# Patient Record
Sex: FEMALE | Race: WHITE | NOT HISPANIC OR LATINO | URBAN - METROPOLITAN AREA
[De-identification: names, ages, dates, MRNs, and addresses within clinical notes are randomized per-mention and may not be internally consistent; named-entity substitution may affect disease eponyms.]

---

## 2024-01-26 ENCOUNTER — APPOINTMENT (OUTPATIENT)
Dept: PRIMARY CARE | Facility: CLINIC | Age: 24
End: 2024-01-26

## 2024-01-28 ENCOUNTER — HOSPITAL ENCOUNTER (OUTPATIENT)
Dept: RADIOLOGY | Facility: EXTERNAL LOCATION | Age: 24
Discharge: HOME | End: 2024-01-28

## 2024-01-28 DIAGNOSIS — M25.571 ACUTE RIGHT ANKLE PAIN: ICD-10-CM

## 2024-10-31 ENCOUNTER — APPOINTMENT (OUTPATIENT)
Dept: OBSTETRICS AND GYNECOLOGY | Facility: CLINIC | Age: 24
End: 2024-10-31
Payer: COMMERCIAL

## 2024-11-07 ENCOUNTER — APPOINTMENT (OUTPATIENT)
Dept: OBSTETRICS AND GYNECOLOGY | Facility: CLINIC | Age: 24
End: 2024-11-07
Payer: COMMERCIAL

## 2024-12-19 ENCOUNTER — APPOINTMENT (OUTPATIENT)
Dept: OBSTETRICS AND GYNECOLOGY | Facility: CLINIC | Age: 24
End: 2024-12-19
Payer: COMMERCIAL

## 2024-12-19 ENCOUNTER — LAB (OUTPATIENT)
Dept: LAB | Facility: LAB | Age: 24
End: 2024-12-19
Payer: COMMERCIAL

## 2024-12-19 VITALS
WEIGHT: 205 LBS | HEIGHT: 70 IN | BODY MASS INDEX: 29.35 KG/M2 | SYSTOLIC BLOOD PRESSURE: 118 MMHG | DIASTOLIC BLOOD PRESSURE: 74 MMHG

## 2024-12-19 DIAGNOSIS — Z01.419 WOMEN'S ANNUAL ROUTINE GYNECOLOGICAL EXAMINATION: ICD-10-CM

## 2024-12-19 DIAGNOSIS — Z01.419 WOMEN'S ANNUAL ROUTINE GYNECOLOGICAL EXAMINATION: Primary | ICD-10-CM

## 2024-12-19 LAB — HIV 1+2 AB+HIV1 P24 AG SERPL QL IA: NONREACTIVE

## 2024-12-19 PROCEDURE — 87591 N.GONORRHOEAE DNA AMP PROB: CPT

## 2024-12-19 PROCEDURE — 3008F BODY MASS INDEX DOCD: CPT | Performed by: ADVANCED PRACTICE MIDWIFE

## 2024-12-19 PROCEDURE — 87389 HIV-1 AG W/HIV-1&-2 AB AG IA: CPT

## 2024-12-19 PROCEDURE — 36415 COLL VENOUS BLD VENIPUNCTURE: CPT

## 2024-12-19 PROCEDURE — 86803 HEPATITIS C AB TEST: CPT

## 2024-12-19 PROCEDURE — 86780 TREPONEMA PALLIDUM: CPT

## 2024-12-19 PROCEDURE — 87205 SMEAR GRAM STAIN: CPT

## 2024-12-19 PROCEDURE — 1036F TOBACCO NON-USER: CPT | Performed by: ADVANCED PRACTICE MIDWIFE

## 2024-12-19 PROCEDURE — 87340 HEPATITIS B SURFACE AG IA: CPT

## 2024-12-19 PROCEDURE — 99385 PREV VISIT NEW AGE 18-39: CPT | Performed by: ADVANCED PRACTICE MIDWIFE

## 2024-12-19 PROCEDURE — 87491 CHLMYD TRACH DNA AMP PROBE: CPT

## 2024-12-19 RX ORDER — HYDROXYZINE HYDROCHLORIDE 25 MG/1
25 TABLET, FILM COATED ORAL AS NEEDED
COMMUNITY
Start: 2024-09-30

## 2024-12-19 RX ORDER — SERTRALINE HYDROCHLORIDE 100 MG/1
100 TABLET, FILM COATED ORAL EVERY 24 HOURS
COMMUNITY
Start: 2021-03-30

## 2024-12-19 ASSESSMENT — PAIN SCALES - GENERAL: PAINLEVEL_OUTOF10: 0-NO PAIN

## 2024-12-19 NOTE — PROGRESS NOTES
"Assessment/Plan   Diagnoses and all orders for this visit:  Women's annual routine gynecological examination      Liza NOEL Jan, APRN-CN     Subjective   Darrell Goldman is a 24 y.o. female who is here for a routine exam.     Concerns today:  Denies specific concerns today    Patient's last menstrual period was 2024 (exact date).   Periods are regular every 28-30 days, lasting 5 days.   Dysmenorrhea:mild, occurring premenstrually and first 1-2 days of flow.   Cyclic symptoms include pelvic pain.     Sexual Activity: sexually active, male partners; Patient reports 1 partners in the last 12 months.  Pain with intercourse? No   Loss of desire? No       History of prior STI: none    Current contraception: IUD    Last pap:   History of abnormal Pap smear: no  Family history of uterine or ovarian cancer: yes - uterine cancer/mother    Last mammogram: n/a  History of abnormal mammogram: no  Family history of breast cancer: no  Menstrual History:  OB History          0    Para   0    Term   0       0    AB   0    Living   0         SAB   0    IAB   0    Ectopic   0    Multiple   0    Live Births   0                Patient's last menstrual period was 2024 (exact date).       Objective   /74 (BP Location: Left arm, Patient Position: Sitting)   Ht 1.778 m (5' 10\")   Wt 93 kg (205 lb)   LMP 2024 (Exact Date)   BMI 29.41 kg/m²   Physical Exam  Constitutional:       Appearance: Normal appearance.   Genitourinary:      Bladder and rectum normal.      No lesions in the vagina.      Right Labia: No rash, tenderness, lesions, skin changes or Bartholin's cyst.     Left Labia: No tenderness, lesions, skin changes, Bartholin's cyst or rash.     No vaginal discharge, erythema, tenderness or bleeding.      No vaginal prolapse present.     No vaginal atrophy present.       Right Adnexa: not tender, not full and no mass present.     Left Adnexa: not tender, not full and no mass present.   "   No cervical motion tenderness, discharge, friability, lesion, polyp or nabothian cyst.      IUD strings visualized.      Uterus is not enlarged, fixed, tender, irregular or prolapsed.      No uterine mass detected.     Pelvic floor neuro is intact.  Breasts:     Breasts are soft.     Right: Normal.      Left: Normal.   Cardiovascular:      Rate and Rhythm: Normal rate and regular rhythm.   Pulmonary:      Effort: Pulmonary effort is normal.      Breath sounds: Normal breath sounds.   Abdominal:      General: Abdomen is flat. Bowel sounds are normal.      Palpations: Abdomen is soft.   Musculoskeletal:         General: Normal range of motion.      Cervical back: Normal range of motion.   Neurological:      General: No focal deficit present.      Mental Status: She is alert and oriented to person, place, and time. Mental status is at baseline.   Skin:     General: Skin is warm and dry.   Psychiatric:         Mood and Affect: Mood normal.         Behavior: Behavior normal.         Thought Content: Thought content normal.         Judgment: Judgment normal.     Annual exam in 1 year     Liza Montoya, APRN-CNM

## 2024-12-20 LAB
BACTERIAL VAGINOSIS VAG-IMP: NORMAL
C TRACH RRNA SPEC QL NAA+PROBE: NEGATIVE
CLUE CELLS VAG LPF-#/AREA: NORMAL /[LPF]
HBV SURFACE AG SERPL QL IA: NONREACTIVE
HCV AB SER QL: NONREACTIVE
N GONORRHOEA DNA SPEC QL PROBE+SIG AMP: NEGATIVE
NUGENT SCORE: 6
TREPONEMA PALLIDUM IGG+IGM AB [PRESENCE] IN SERUM OR PLASMA BY IMMUNOASSAY: NONREACTIVE
YEAST VAG WET PREP-#/AREA: NORMAL

## 2025-02-20 ENCOUNTER — OFFICE VISIT (OUTPATIENT)
Dept: PRIMARY CARE | Facility: CLINIC | Age: 25
End: 2025-02-20
Payer: COMMERCIAL

## 2025-02-20 VITALS
SYSTOLIC BLOOD PRESSURE: 112 MMHG | DIASTOLIC BLOOD PRESSURE: 80 MMHG | HEIGHT: 70 IN | BODY MASS INDEX: 30.06 KG/M2 | WEIGHT: 210 LBS | OXYGEN SATURATION: 98 % | HEART RATE: 77 BPM

## 2025-02-20 DIAGNOSIS — Z00.00 ANNUAL PHYSICAL EXAM: ICD-10-CM

## 2025-02-20 DIAGNOSIS — Z76.89 ENCOUNTER TO ESTABLISH CARE: Primary | ICD-10-CM

## 2025-02-20 PROBLEM — F41.1 GENERALIZED ANXIETY DISORDER: Status: ACTIVE | Noted: 2025-02-20

## 2025-02-20 PROBLEM — K58.9 IBS (IRRITABLE BOWEL SYNDROME): Status: ACTIVE | Noted: 2025-02-20

## 2025-02-20 PROBLEM — F41.0 PANIC ATTACKS: Status: ACTIVE | Noted: 2025-02-20

## 2025-02-20 PROBLEM — Z90.89 S/P TONSILLECTOMY AND ADENOIDECTOMY: Status: ACTIVE | Noted: 2025-02-20

## 2025-02-20 PROCEDURE — 99385 PREV VISIT NEW AGE 18-39: CPT

## 2025-02-20 PROCEDURE — 3008F BODY MASS INDEX DOCD: CPT

## 2025-02-20 PROCEDURE — 1036F TOBACCO NON-USER: CPT

## 2025-02-20 ASSESSMENT — ENCOUNTER SYMPTOMS
DYSURIA: 0
LIGHT-HEADEDNESS: 0
MYALGIAS: 0
SORE THROAT: 0
BLOOD IN STOOL: 0
ADENOPATHY: 0
COUGH: 0
NAUSEA: 0
FATIGUE: 0
FEVER: 0
HEMATURIA: 0
DIFFICULTY URINATING: 0
ARTHRALGIAS: 0
DIAPHORESIS: 0
WHEEZING: 0
SHORTNESS OF BREATH: 0
PALPITATIONS: 0
DIARRHEA: 0
VOMITING: 0

## 2025-02-20 ASSESSMENT — PAIN SCALES - GENERAL: PAINLEVEL_OUTOF10: 0-NO PAIN

## 2025-02-20 ASSESSMENT — PATIENT HEALTH QUESTIONNAIRE - PHQ9
1. LITTLE INTEREST OR PLEASURE IN DOING THINGS: NOT AT ALL
2. FEELING DOWN, DEPRESSED OR HOPELESS: NOT AT ALL
SUM OF ALL RESPONSES TO PHQ9 QUESTIONS 1 AND 2: 0

## 2025-02-20 NOTE — PROGRESS NOTES
"Subjective   Patient ID: Darrell Goldman is a 24 y.o. female who presents for Establish Care.    Hx of EDWARD, IBS    New patient, has not had PCP    Other providers: [facility], [facility] for psychiatrist and therapist (rx sertraline and hydroxyzine)    Fhx: +stroke, mental health, alcohol use disorder    HM: PAP 1/2023 UTD through ob/gyn. Mammogram start 41. Colon screening start 46. Lung screening never smoker. DEXA start 66. Vaccinations Tdap (through work), HPV UTD, flu UTD.           Review of Systems   Constitutional:  Negative for diaphoresis, fatigue and fever.   HENT:  Negative for congestion, hearing loss and sore throat.    Eyes:  Negative for visual disturbance.   Respiratory:  Negative for cough, shortness of breath and wheezing.    Cardiovascular:  Negative for chest pain, palpitations and leg swelling.   Gastrointestinal:  Negative for blood in stool, diarrhea, nausea and vomiting.   Genitourinary:  Negative for difficulty urinating, dysuria and hematuria.   Musculoskeletal:  Negative for arthralgias and myalgias.   Skin:  Negative for rash.   Allergic/Immunologic: Negative for immunocompromised state.   Neurological:  Negative for syncope and light-headedness.   Hematological:  Negative for adenopathy.   Psychiatric/Behavioral:  Negative for suicidal ideas.        Objective   /80   Pulse 77   Ht 1.778 m (5' 10\")   Wt 95.3 kg (210 lb)   SpO2 98%   BMI 30.13 kg/m²     Physical Exam  Constitutional:       General: She is not in acute distress.     Appearance: Normal appearance.   HENT:      Right Ear: Tympanic membrane and ear canal normal.      Left Ear: Tympanic membrane and ear canal normal.      Nose: Nose normal.      Mouth/Throat:      Mouth: Mucous membranes are moist.      Pharynx: Oropharynx is clear. No oropharyngeal exudate or posterior oropharyngeal erythema.   Eyes:      Extraocular Movements: Extraocular movements intact.      Conjunctiva/sclera: " Conjunctivae normal.      Pupils: Pupils are equal, round, and reactive to light.   Neck:      Thyroid: No thyroid mass or thyromegaly.   Cardiovascular:      Rate and Rhythm: Normal rate and regular rhythm.      Pulses: Normal pulses.      Heart sounds: No murmur heard.     No gallop.   Pulmonary:      Effort: Pulmonary effort is normal.      Breath sounds: No wheezing, rhonchi or rales.   Abdominal:      General: Bowel sounds are normal.      Palpations: Abdomen is soft. There is no hepatomegaly, splenomegaly or mass.      Tenderness: There is no abdominal tenderness. There is no guarding.   Musculoskeletal:         General: Normal range of motion.      Right lower leg: No edema.      Left lower leg: No edema.   Lymphadenopathy:      Cervical: No cervical adenopathy.   Skin:     General: Skin is warm.      Findings: No rash.   Neurological:      General: No focal deficit present.      Mental Status: She is alert.      Motor: Motor function is intact. No weakness.   Psychiatric:         Mood and Affect: Mood normal.         Thought Content: Thought content normal.         Assessment/Plan   Diagnoses and all orders for this visit:  Encounter to establish care  Annual physical exam  Follow-up one year for CPE.

## 2025-02-27 ENCOUNTER — APPOINTMENT (OUTPATIENT)
Dept: PRIMARY CARE | Facility: CLINIC | Age: 25
End: 2025-02-27
Payer: COMMERCIAL

## 2025-06-07 ENCOUNTER — OFFICE VISIT (OUTPATIENT)
Dept: URGENT CARE | Age: 25
End: 2025-06-07
Payer: COMMERCIAL

## 2025-06-07 VITALS
DIASTOLIC BLOOD PRESSURE: 65 MMHG | SYSTOLIC BLOOD PRESSURE: 106 MMHG | HEART RATE: 90 BPM | RESPIRATION RATE: 18 BRPM | TEMPERATURE: 98.1 F | OXYGEN SATURATION: 100 % | BODY MASS INDEX: 27.2 KG/M2 | HEIGHT: 70 IN | WEIGHT: 190 LBS

## 2025-06-07 DIAGNOSIS — S39.012A STRAIN OF LUMBAR REGION, INITIAL ENCOUNTER: Primary | ICD-10-CM

## 2025-06-07 PROCEDURE — 1036F TOBACCO NON-USER: CPT | Performed by: NURSE PRACTITIONER

## 2025-06-07 PROCEDURE — 3008F BODY MASS INDEX DOCD: CPT | Performed by: NURSE PRACTITIONER

## 2025-06-07 PROCEDURE — 99213 OFFICE O/P EST LOW 20 MIN: CPT | Performed by: NURSE PRACTITIONER

## 2025-06-07 RX ORDER — CYCLOBENZAPRINE HCL 10 MG
5 TABLET ORAL 3 TIMES DAILY
Qty: 20 TABLET | Refills: 0 | Status: SHIPPED | OUTPATIENT
Start: 2025-06-07 | End: 2025-06-17

## 2025-06-07 RX ORDER — METHYLPREDNISOLONE 4 MG/1
TABLET ORAL
Qty: 21 TABLET | Refills: 0 | Status: SHIPPED | OUTPATIENT
Start: 2025-06-07 | End: 2025-06-13

## 2025-06-07 ASSESSMENT — ENCOUNTER SYMPTOMS: BACK PAIN: 1

## 2025-06-07 NOTE — PROGRESS NOTES
"Subjective   Patient ID: Darrell Goldman is a 24 y.o. female. They present today with a chief complaint of Back Pain (Low back Pain x 1 week//[facility]).    History of Present Illness  Here for lower back pain - has started after playing volleyball  Denies saddle numbness or change in bowel or bladder      Back Pain        Past Medical History  Allergies as of 06/07/2025 - Reviewed 06/07/2025   Allergen Reaction Noted    Sulfa (sulfonamide antibiotics) Hives 12/19/2024       Prescriptions Prior to Admission[1]     Medical History[2]    Surgical History[3]     reports that she has never smoked. She has never used smokeless tobacco. She reports that she does not currently use alcohol. She reports that she does not use drugs.    Review of Systems  Review of Systems   Musculoskeletal:  Positive for back pain.   All other systems reviewed and are negative.                                 Objective    Vitals:    06/07/25 1058   BP: 106/65   BP Location: Left arm   Patient Position: Sitting   Pulse: 90   Resp: 18   Temp: 36.7 °C (98.1 °F)   TempSrc: Oral   SpO2: 100%   Weight: 86.2 kg (190 lb)   Height: 1.778 m (5' 10\")     No LMP recorded. (Menstrual status: IUD).    Physical Exam  Vitals reviewed.   Constitutional:       Appearance: Normal appearance.   Pulmonary:      Effort: Pulmonary effort is normal.   Musculoskeletal:      Cervical back: Normal.      Thoracic back: Normal.      Lumbar back: Normal.        Back:       Comments: No change in ROM, pedal pulses 2+   Neurological:      Mental Status: She is alert.         Procedures    Point of Care Test & Imaging Results from this visit  No results found for this visit on 06/07/25.   Imaging  No results found.    Cardiology, Vascular, and Other Imaging  No other imaging results found for the past 2 days      Diagnostic study results (if any) were reviewed by ELSIE Palafox.    Assessment/Plan   Allergies, medications, history, and pertinent labs/EKGs/Imaging " reviewed by ELSIE Palafox.     Medical Decision Making  Start medrol dose pack and flexeril  Rest and ice - discussed adding tylenol and otc lidocaine patches or topical muscle rubs  May add ice or heat for comfort    Orders and Diagnoses  Encounter Diagnosis   Name Primary?    Strain of lumbar region, initial encounter Yes         Medical Admin Record      Patient disposition: Home    Electronically signed by ELSIE Palafox  11:02 AM           [1] (Not in a hospital admission)  [2]   Past Medical History:  Diagnosis Date    Anxiety Sept 2020    Personal history of other specified conditions 03/26/2018    History of abdominal pain    Unspecified injury of left foot, initial encounter 05/10/2016    Injury of left foot, initial encounter    Unspecified injury of right foot, initial encounter 05/10/2016    Injury of right foot, initial encounter    Urinary tract infection    [3]   Past Surgical History:  Procedure Laterality Date    ADENOIDECTOMY  August 5 2019    OTHER SURGICAL HISTORY  03/30/2021    Dental Surgery    OTHER SURGICAL HISTORY  03/31/2021    Tonsillectomy    TONSILLECTOMY  August 5 2019    WISDOM TOOTH EXTRACTION  December 20 2019

## 2025-06-07 NOTE — LETTER
June 7, 2025     Patient: Darrell Goldman   YOB: 2000   Date of Visit: 6/7/2025       To Whom It May Concern:    It is my medical opinion that Darrell Goldman may return to work on 06/11/2025.    If you have any questions or concerns, please don't hesitate to call.         Sincerely,        Maddi Mejia, APRN-CNP    CC: No Recipients

## 2025-06-09 ENCOUNTER — OFFICE VISIT (OUTPATIENT)
Dept: PRIMARY CARE | Facility: CLINIC | Age: 25
End: 2025-06-09
Payer: COMMERCIAL

## 2025-06-09 ENCOUNTER — HOSPITAL ENCOUNTER (OUTPATIENT)
Dept: RADIOLOGY | Facility: CLINIC | Age: 25
Discharge: HOME | End: 2025-06-09
Payer: COMMERCIAL

## 2025-06-09 VITALS
WEIGHT: 201 LBS | DIASTOLIC BLOOD PRESSURE: 78 MMHG | HEIGHT: 70 IN | HEART RATE: 93 BPM | SYSTOLIC BLOOD PRESSURE: 128 MMHG | BODY MASS INDEX: 28.77 KG/M2 | OXYGEN SATURATION: 99 %

## 2025-06-09 DIAGNOSIS — M54.50 ACUTE BILATERAL LOW BACK PAIN WITHOUT SCIATICA: ICD-10-CM

## 2025-06-09 DIAGNOSIS — M54.50 ACUTE BILATERAL LOW BACK PAIN WITHOUT SCIATICA: Primary | ICD-10-CM

## 2025-06-09 PROCEDURE — 1036F TOBACCO NON-USER: CPT

## 2025-06-09 PROCEDURE — 3008F BODY MASS INDEX DOCD: CPT

## 2025-06-09 PROCEDURE — 99213 OFFICE O/P EST LOW 20 MIN: CPT

## 2025-06-09 PROCEDURE — 72110 X-RAY EXAM L-2 SPINE 4/>VWS: CPT | Performed by: RADIOLOGY

## 2025-06-09 PROCEDURE — 72110 X-RAY EXAM L-2 SPINE 4/>VWS: CPT

## 2025-06-09 RX ORDER — IBUPROFEN 800 MG/1
800 TABLET, FILM COATED ORAL 3 TIMES DAILY
Qty: 30 TABLET | Refills: 1 | Status: SHIPPED | OUTPATIENT
Start: 2025-06-09

## 2025-06-09 RX ORDER — KETOROLAC TROMETHAMINE 30 MG/ML
60 INJECTION, SOLUTION INTRAMUSCULAR; INTRAVENOUS ONCE
Status: COMPLETED | OUTPATIENT
Start: 2025-06-09 | End: 2025-06-09

## 2025-06-09 RX ADMIN — KETOROLAC TROMETHAMINE 60 MG: 30 INJECTION, SOLUTION INTRAMUSCULAR; INTRAVENOUS at 11:45

## 2025-06-09 ASSESSMENT — ENCOUNTER SYMPTOMS
CHILLS: 0
BACK PAIN: 1
FEVER: 0
NECK PAIN: 0
WEAKNESS: 0

## 2025-06-09 ASSESSMENT — PATIENT HEALTH QUESTIONNAIRE - PHQ9
SUM OF ALL RESPONSES TO PHQ9 QUESTIONS 1 AND 2: 0
2. FEELING DOWN, DEPRESSED OR HOPELESS: NOT AT ALL
1. LITTLE INTEREST OR PLEASURE IN DOING THINGS: NOT AT ALL

## 2025-06-09 ASSESSMENT — PAIN SCALES - GENERAL: PAINLEVEL_OUTOF10: 7

## 2025-06-09 NOTE — PROGRESS NOTES
Note written by New Provider in Training, ELSIE Kenny. Seen and evaluated with CNP Agree with plan below.

## 2025-06-09 NOTE — PROGRESS NOTES
"Subjective   Patient ID: Darrell Goldman is a 24 y.o. female who presents for Follow-up.    HPI   Pt reported to urgent care 6/7 with back pain from volleyball injury 5/31. While diving for ball, pt felt a \"stretching\" muscle pain across lower back and pain into b/l hips. Pain is currently 7/10, worsens with sitting. She was given steroid pack and muscle relaxant from the urgent care, that she feels has not significantly helped with pain. She was given off of work until Weds 6/11, but continues to have severe pain/ discomfort.    Review of Systems   Constitutional:  Negative for chills and fever.   Musculoskeletal:  Positive for back pain. Negative for gait problem and neck pain.        Pain worsening with abduction of lower back   Skin:  Negative for rash.   Neurological:  Negative for weakness.       Objective   /78   Pulse 93   Ht 1.778 m (5' 10\")   Wt 91.2 kg (201 lb)   SpO2 99%   BMI 28.84 kg/m²     Physical Exam  Cardiovascular:      Heart sounds: Normal heart sounds. No murmur heard.  Pulmonary:      Effort: Pulmonary effort is normal.      Breath sounds: Normal breath sounds.   Musculoskeletal:      Cervical back: No edema or tenderness.      Thoracic back: No edema or tenderness.      Lumbar back: Tenderness and bony tenderness present. No spasms. Decreased range of motion. Negative right straight leg raise test and negative left straight leg raise test.      Comments: Although b/l straight leg negative, test did evoke pain in lower back; limited ROM in b/l hip flexion         Assessment/Plan   Diagnoses and all orders for this visit:  Acute bilateral low back pain without sciatica  -     XR lumbar spine 2-3 views; Future  -     ketorolac (Toradol) injection 60 mg  -     ibuprofen 800 mg tablet; Take 1 tablet (800 mg) by mouth 3 times a day.    Can stop medrol dose pack today and start ibuprofen & tylenol alternating, ice and rest with light stretching. Pt given xray order today, along with " work note until 6/16/25.

## 2025-06-09 NOTE — LETTER
June 9, 2025     Patient: Darrell Goldman   YOB: 2000   Date of Visit: 6/9/2025       To Whom It May Concern:    Darrell Goldman was seen in my clinic on 6/9/2025 at 11:30 am. Please excuse Darrell for her absence from work from 6/9/25 through 6/16/2025 due to acute injury. May return to work on 6/16/25.    If you have any questions or concerns, please don't hesitate to call.         Sincerely,         Su Kennedy PA-C        CC: No Recipients

## 2025-06-11 ENCOUNTER — TELEPHONE (OUTPATIENT)
Dept: PRIMARY CARE | Facility: CLINIC | Age: 25
End: 2025-06-11
Payer: COMMERCIAL

## 2025-08-28 ENCOUNTER — OFFICE VISIT (OUTPATIENT)
Dept: OBSTETRICS AND GYNECOLOGY | Facility: CLINIC | Age: 25
End: 2025-08-28
Payer: COMMERCIAL

## 2025-08-28 VITALS
BODY MASS INDEX: 28.92 KG/M2 | DIASTOLIC BLOOD PRESSURE: 84 MMHG | SYSTOLIC BLOOD PRESSURE: 144 MMHG | WEIGHT: 202 LBS | HEIGHT: 70 IN

## 2025-08-28 DIAGNOSIS — L73.9 FOLLICULITIS: ICD-10-CM

## 2025-08-28 DIAGNOSIS — Z11.3 SCREEN FOR STD (SEXUALLY TRANSMITTED DISEASE): Primary | ICD-10-CM

## 2025-08-28 PROCEDURE — 1036F TOBACCO NON-USER: CPT | Performed by: OBSTETRICS & GYNECOLOGY

## 2025-08-28 PROCEDURE — 99204 OFFICE O/P NEW MOD 45 MIN: CPT | Performed by: OBSTETRICS & GYNECOLOGY

## 2025-08-28 PROCEDURE — 3008F BODY MASS INDEX DOCD: CPT | Performed by: OBSTETRICS & GYNECOLOGY

## 2025-08-28 RX ORDER — CEPHALEXIN 500 MG/1
500 CAPSULE ORAL 3 TIMES DAILY
Qty: 30 CAPSULE | Refills: 0 | Status: SHIPPED | OUTPATIENT
Start: 2025-08-28 | End: 2025-09-07

## 2025-08-28 ASSESSMENT — ENCOUNTER SYMPTOMS
ENDOCRINE NEGATIVE: 0
HEMATOLOGIC/LYMPHATIC NEGATIVE: 0
PSYCHIATRIC NEGATIVE: 0
CONSTITUTIONAL NEGATIVE: 0
CARDIOVASCULAR NEGATIVE: 0
RESPIRATORY NEGATIVE: 0
NEUROLOGICAL NEGATIVE: 0
ALLERGIC/IMMUNOLOGIC NEGATIVE: 0
GASTROINTESTINAL NEGATIVE: 0
MUSCULOSKELETAL NEGATIVE: 0
EYES NEGATIVE: 0

## 2025-08-29 LAB
C TRACH RRNA SPEC QL NAA+PROBE: NOT DETECTED
HBV SURFACE AG SERPL QL IA: NORMAL
HCV AB SERPL QL IA: NORMAL
HIV 1+2 AB+HIV1 P24 AG SERPL QL IA: NORMAL
HIV 1+2 AB+HIV1 P24 AG SERPL QL IA: NORMAL
N GONORRHOEA RRNA SPEC QL NAA+PROBE: NOT DETECTED
QUEST GC CT AMPLIFIED (ALWAYS MESSAGE): NORMAL
T PALLIDUM AB SER QL IA: NORMAL
T VAGINALIS RRNA SPEC QL NAA+PROBE: NOT DETECTED

## 2025-08-30 LAB
BV SCORE VAG QL: ABNORMAL
C TRACH RRNA SPEC QL NAA+PROBE: NOT DETECTED
HBV SURFACE AG SERPL QL IA: NORMAL
HBV SURFACE AG SERPL QL NT: NORMAL
HCV AB SERPL QL IA: NORMAL
HIV 1+2 AB+HIV1 P24 AG SERPL QL IA: NORMAL
HIV 1+2 AB+HIV1 P24 AG SERPL QL IA: NORMAL
N GONORRHOEA RRNA SPEC QL NAA+PROBE: NOT DETECTED
QUEST GC CT AMPLIFIED (ALWAYS MESSAGE): NORMAL
T PALLIDUM AB SER QL IA: NORMAL
T VAGINALIS RRNA SPEC QL NAA+PROBE: NOT DETECTED

## 2025-09-03 ENCOUNTER — RESULTS FOLLOW-UP (OUTPATIENT)
Dept: OBSTETRICS AND GYNECOLOGY | Facility: CLINIC | Age: 25
End: 2025-09-03
Payer: COMMERCIAL

## 2025-09-03 ENCOUNTER — TELEPHONE (OUTPATIENT)
Dept: OBSTETRICS AND GYNECOLOGY | Facility: HOSPITAL | Age: 25
End: 2025-09-03
Payer: COMMERCIAL

## 2025-09-03 LAB
BV SCORE VAG QL: ABNORMAL
C TRACH RRNA SPEC QL NAA+PROBE: NOT DETECTED
HBV SURFACE AG SERPL QL IA: NORMAL
HCV AB SERPL QL IA: NORMAL
HIV 1+2 AB+HIV1 P24 AG SERPL QL IA: NORMAL
HIV 1+2 AB+HIV1 P24 AG SERPL QL IA: NORMAL
N GONORRHOEA RRNA SPEC QL NAA+PROBE: NOT DETECTED
QUEST GC CT AMPLIFIED (ALWAYS MESSAGE): NORMAL
T PALLIDUM AB SER QL IA: NEGATIVE
T VAGINALIS RRNA SPEC QL NAA+PROBE: NOT DETECTED

## 2025-09-08 ENCOUNTER — APPOINTMENT (OUTPATIENT)
Dept: OBSTETRICS AND GYNECOLOGY | Facility: HOSPITAL | Age: 25
End: 2025-09-08
Payer: COMMERCIAL